# Patient Record
Sex: MALE | Race: WHITE | NOT HISPANIC OR LATINO | Employment: FULL TIME | URBAN - METROPOLITAN AREA
[De-identification: names, ages, dates, MRNs, and addresses within clinical notes are randomized per-mention and may not be internally consistent; named-entity substitution may affect disease eponyms.]

---

## 2023-01-27 ENCOUNTER — OFFICE VISIT (OUTPATIENT)
Dept: ORTHOPEDIC SURGERY | Facility: CLINIC | Age: 63
End: 2023-01-27
Payer: COMMERCIAL

## 2023-01-27 VITALS
BODY MASS INDEX: 27.43 KG/M2 | SYSTOLIC BLOOD PRESSURE: 138 MMHG | DIASTOLIC BLOOD PRESSURE: 90 MMHG | WEIGHT: 185.2 LBS | HEIGHT: 69 IN

## 2023-01-27 DIAGNOSIS — M25.531 RIGHT WRIST PAIN: Primary | ICD-10-CM

## 2023-01-27 DIAGNOSIS — S69.81XA INJURY OF TRIANGULAR FIBROCARTILAGE COMPLEX (TFCC) OF RIGHT WRIST, INITIAL ENCOUNTER: ICD-10-CM

## 2023-01-27 DIAGNOSIS — M77.8 EXTENSOR CARPI ULNARIS TENDINITIS: ICD-10-CM

## 2023-01-27 PROCEDURE — 99204 OFFICE O/P NEW MOD 45 MIN: CPT | Performed by: STUDENT IN AN ORGANIZED HEALTH CARE EDUCATION/TRAINING PROGRAM

## 2023-01-27 RX ORDER — NEBIVOLOL 5 MG/1
TABLET ORAL
COMMUNITY
Start: 2019-03-15

## 2023-01-27 RX ORDER — FAMOTIDINE 10 MG/ML
INJECTION, SOLUTION INTRAVENOUS
COMMUNITY
Start: 2019-03-15

## 2023-01-27 RX ORDER — METHYLPREDNISOLONE 4 MG/1
TABLET ORAL
Qty: 21 TABLET | Refills: 0 | Status: SHIPPED | OUTPATIENT
Start: 2023-01-27

## 2023-01-27 NOTE — PROGRESS NOTES
[facility] [place] [facility] Office Visit     Office Visit       Date: 01/27/2023   Patient Name: Geovanni Pabon  MRN: [id number]  YOB: 1960    Referring Physician: Referring, Self     Chief Complaint:   Chief Complaint   Patient presents with   • Right Wrist - Pain       History of Present Illness:   Geovanni Pabon is a 62 y.o. male who presents with new problem of: right wrist pain.  Onset: atraumatic and gradual in nature. The issue has been ongoing for 3+ week(s). Pain is a 3/10 on the pain scale. Pain is described as dull. Associated symptoms include pain. The pain is worse with any movement of the joint; ice, Voltaren gel and pain medication and/or NSAID improve the pain. Previous treatments have included: NSAIDS.    Subjective   Review of Systems: Review of Systems   Constitutional: Negative for chills, fever, unexpected weight gain and unexpected weight loss.   HENT: Negative for congestion, postnasal drip and rhinorrhea.    Eyes: Negative for blurred vision.   Respiratory: Negative for shortness of breath.    Cardiovascular: Negative for leg swelling.   Gastrointestinal: Negative for abdominal pain, nausea and vomiting.   Genitourinary: Negative for difficulty urinating.   Musculoskeletal: Positive for arthralgias. Negative for gait problem, joint swelling and myalgias.   Skin: Negative for skin lesions and wound.   Neurological: Negative for dizziness, weakness, light-headedness and numbness.   Hematological: Does not bruise/bleed easily.   Psychiatric/Behavioral: Negative for depressed mood.        I have reviewed the following portions of the patient's history:History of Present Illness and review of systems.    Past Medical History:   Past Medical History:   Diagnosis Date   • Hyperlipidemia    • Hypertension    • Wrist sprain 01/02,2023    pain in R wrist       Past Surgical History: History reviewed. No pertinent surgical history.    Family History:  "  Family History   Problem Relation Age of Onset   • Heart attack Father 82   • Cancer Brother          from colon cancer at age 59, skin cancer       Social History:   Social History     Socioeconomic History   • Marital status:    Tobacco Use   • Smoking status: Never   • Smokeless tobacco: Never   Substance and Sexual Activity   • Alcohol use: Yes     Alcohol/week: 4.0 standard drinks     Types: 4 Cans of beer per week   • Drug use: Never   • Sexual activity: Yes     Partners: Female     Comment: my wife of 42 years       Medications:   Current Outpatient Medications:   •  famotidine (PEPCID) 40 MG/4ML solution injection, , Disp: , Rfl:   •  nebivolol (BYSTOLIC) 5 MG tablet, , Disp: , Rfl:   •  Rosuvastatin Calcium 40 MG capsule sprinkle, Take  by mouth., Disp: , Rfl:   •  methylPREDNISolone (MEDROL) 4 MG dose pack, Take as directed on package instructions., Disp: 21 tablet, Rfl: 0    Allergies:   Allergies   Allergen Reactions   • Sulfa Antibiotics Hives       I reviewed the patient's chief complaint, history of present illness, review of systems, past medical history, surgical history, family history, social history, medications and allergy list.     Objective    Vital Signs:   Vitals:    23 1426   BP: 138/90   Weight: 84 kg (185 lb 3.2 oz)   Height: 174 cm (68.5\")     Body mass index is 27.75 kg/m². BMI is >= 25 and <30. (Overweight) The following options were offered after discussion;: exercise counseling/recommendations and nutrition counseling/recommendations  .   Patient reports that he is a non-smoker and has not ever been a smoker.  This behavior was applauded and he was encouraged to continue in smoking cessation.  We will continue to monitor at subsequent visits.    Ortho Exam:  Constitutional: General Appearance: healthy-appearing, NAD, and normal body habitus.   Psychiatric: Mood and Affect: normal mood and affect and active and alert.   Cardiovascular System: Arterial Pulses Right: " radial pulse normal and ulnar pulse normal. Edema Right: none. Varicosities Right: no varicosities and capillary refill test normal.   Wrists: Inspection Right: no erythema, induration, swelling, warmth, or mass and normal wrist appearance. Palpation of the Radial Aspect Right: no tenderness of the distal forearm, the radial styloid process, the navicular, the trapezoid, the tubercle of radius, the capitate, the first metacarpal, the abductor tendon, the extensor tendon, the flexor carpi radialis, or the palmaris longus. Palpation of the Ulnar Aspect Right: no tenderness of the lunate, the triquetral, the lunotriquetral joint, the hook of hamate, the pisotriquetral joint, the pisiform, the flexor carpi ulnaris, or the canal of guyon and tenderness of the ulnar styloid process and the carpi ulnaris extensor tendon. Active Range of Motion Right: flexion normal, extension normal, pronation normal, supination normal, radial motion normal, thumb motion normal, and ulnar motion (5 deg.). Strength Right: extension 5/5, flexion 5/5, pronation 5/5, supination 5/5, radial deviation 5/5, thumb 5/5,  5/5, interossei 5/5, and ulnar deviation 4/5. Stability Right: [place]-Littler test negative, lunotriquetral ballottement test negative, Lopez's scaphoid shift test negative, and pivot shift test of midcarpal joint negative.   Skin: Right Upper Extremity: normal.   Neurological System: Sensation on the Right: normal distal extremities. Special Tests on the Right: TFCC grind test negative and ECU subluxation test negative.    Results Review:   Imaging Results (Last 24 Hours)     Procedure Component Value Units Date/Time    XR Wrist 3+ View Right [[id number]] Resulted: 01/27/23 1455     Updated: 01/27/23 9951    Narrative:      Indication: Right wrist pain    Views: AP and lateral views of the wrist are submitted.     Impression: Alignment is normal. There are no acute findings. There is no   fracture subluxation or dislocation.   No significant degenerative changes   noted.    Comparison: No additional images available for comparison review.         Procedures    Assessment / Plan    Assessment/Plan:   Diagnoses and all orders for this visit:    1. Right wrist pain (Primary)  -     Cancel: XR Hand 3+ View Right  -     XR Wrist 3+ View Right    2. Injury of triangular fibrocartilage complex (TFCC) of right wrist, initial encounter    3. Extensor carpi ulnaris tendinitis  -     methylPREDNISolone (MEDROL) 4 MG dose pack; Take as directed on package instructions.  Dispense: 21 tablet; Refill: 0      3 weeks of right ulnar-sided wrist pain.  Symptoms worse with activation of the wrist in extension.  He does CrossFit training 3 times per week which often requires a lot of push-ups and weightlifting exercises that provoke his wrist.  Now he has pain with lifting his coffee cup.  Radiographs negative for acute or degenerative change.  Most tender at the volar aspect of the wrist at the TFCC as well as through the ECU tendon.  Plan to treat his symptoms at this time with a Medrol Dosepak to control his inflammation and pain.  Would encourage him to continue using her wrist but limit activities such as push-ups, weightlifting exercises that greatly exacerbate his pain.  Activities of daily living as tolerated.  Plan to see him back in 2 weeks to monitor his response and decide on additional treatment options.  He has a golf trip in 2 weeks.    Follow Up:   Return in about 2 weeks (around 2/10/2023) for Recheck.      Charlie Palomares MD  [facility]

## 2023-02-09 ENCOUNTER — OFFICE VISIT (OUTPATIENT)
Dept: ORTHOPEDIC SURGERY | Facility: CLINIC | Age: 63
End: 2023-02-09
Payer: COMMERCIAL

## 2023-02-09 VITALS
SYSTOLIC BLOOD PRESSURE: 134 MMHG | DIASTOLIC BLOOD PRESSURE: 84 MMHG | BODY MASS INDEX: 27.44 KG/M2 | HEIGHT: 69 IN | WEIGHT: 185.3 LBS

## 2023-02-09 DIAGNOSIS — S69.81XD INJURY OF TRIANGULAR FIBROCARTILAGE COMPLEX (TFCC) OF RIGHT WRIST, SUBSEQUENT ENCOUNTER: ICD-10-CM

## 2023-02-09 DIAGNOSIS — M77.8 EXTENSOR CARPI ULNARIS TENDINITIS: Primary | ICD-10-CM

## 2023-02-09 PROCEDURE — 76942 ECHO GUIDE FOR BIOPSY: CPT | Performed by: STUDENT IN AN ORGANIZED HEALTH CARE EDUCATION/TRAINING PROGRAM

## 2023-02-09 PROCEDURE — 99213 OFFICE O/P EST LOW 20 MIN: CPT | Performed by: STUDENT IN AN ORGANIZED HEALTH CARE EDUCATION/TRAINING PROGRAM

## 2023-02-09 PROCEDURE — 20551 NJX 1 TENDON ORIGIN/INSJ: CPT | Performed by: STUDENT IN AN ORGANIZED HEALTH CARE EDUCATION/TRAINING PROGRAM

## 2023-02-09 RX ORDER — LIDOCAINE HYDROCHLORIDE 10 MG/ML
1 INJECTION, SOLUTION EPIDURAL; INFILTRATION; INTRACAUDAL; PERINEURAL
Status: COMPLETED | OUTPATIENT
Start: 2023-02-09 | End: 2023-02-09

## 2023-02-09 RX ORDER — FAMOTIDINE 40 MG/1
TABLET, FILM COATED ORAL
COMMUNITY
Start: 2023-01-30

## 2023-02-09 RX ORDER — TRIAMCINOLONE ACETONIDE 40 MG/ML
40 INJECTION, SUSPENSION INTRA-ARTICULAR; INTRAMUSCULAR
Status: COMPLETED | OUTPATIENT
Start: 2023-02-09 | End: 2023-02-09

## 2023-02-09 RX ORDER — MELOXICAM 15 MG/1
15 TABLET ORAL DAILY
Qty: 30 TABLET | Refills: 1 | Status: SHIPPED | OUTPATIENT
Start: 2023-02-09

## 2023-02-09 RX ADMIN — TRIAMCINOLONE ACETONIDE 40 MG: 40 INJECTION, SUSPENSION INTRA-ARTICULAR; INTRAMUSCULAR at 15:34

## 2023-02-09 RX ADMIN — LIDOCAINE HYDROCHLORIDE 1 ML: 10 INJECTION, SOLUTION EPIDURAL; INFILTRATION; INTRACAUDAL; PERINEURAL at 15:34

## 2023-02-09 NOTE — PROGRESS NOTES
[facility] [place] [facility] [place] Up Visit     Office Follow Up      Date: 02/09/2023   Patient Name: Geovanni Pabon  MRN: [id number]  YOB: 1960    Referring Physician: No ref. provider found     Chief Complaint:   Chief Complaint   Patient presents with   • Follow-up     2 weeks f/u; Right wrist pain       History of Present Illness: Geovanni Pabon is a 62 y.o. male who is here today for follow up on right wrist pain.  At last visit, we started on Medrol Dosepak.  Reports interval improvement in symptoms.  Pain is now 1-2/10.  Still has pain with some movement of the wrist especially extension and ulnar deviation. Has not been able to progress back to Crossfit.    Subjective   Review of Systems: Review of Systems   Constitutional: Negative for chills, fever, unexpected weight gain and unexpected weight loss.   HENT: Negative for congestion, postnasal drip and rhinorrhea.    Eyes: Negative for blurred vision.   Respiratory: Negative for shortness of breath.    Cardiovascular: Negative for leg swelling.   Gastrointestinal: Negative for abdominal pain, nausea and vomiting.   Genitourinary: Negative for difficulty urinating.   Musculoskeletal: Positive for arthralgias. Negative for gait problem, joint swelling and myalgias.   Skin: Negative for skin lesions and wound.   Neurological: Negative for dizziness, weakness, light-headedness and numbness.   Hematological: Does not bruise/bleed easily.   Psychiatric/Behavioral: Negative for depressed mood.        Medications:   Current Outpatient Medications:   •  famotidine (PEPCID) 40 MG tablet, , Disp: , Rfl:   •  famotidine (PEPCID) 40 MG/4ML solution injection, , Disp: , Rfl:   •  methylPREDNISolone (MEDROL) 4 MG dose pack, Take as directed on package instructions., Disp: 21 tablet, Rfl: 0  •  nebivolol (BYSTOLIC) 5 MG tablet, , Disp: , Rfl:   •  Rosuvastatin Calcium 40 MG capsule sprinkle, Take  by mouth., Disp: ,  "Rfl:   •  meloxicam (MOBIC) 15 MG tablet, Take 1 tablet by mouth Daily., Disp: 30 tablet, Rfl: 1    Allergies:   Allergies   Allergen Reactions   • Sulfa Antibiotics Hives       I have reviewed and updated the patient's chief complaint, history of present illness, review of systems, past medical history, surgical history, family history, social history, medications and allergy list as appropriate.     Objective    Vital Signs:   Vitals:    02/09/23 1453   BP: 134/84   Weight: 84.1 kg (185 lb 4.8 oz)   Height: 174 cm (68.5\")     Body mass index is 27.76 kg/m².  BMI is >= 25 and <30. (Overweight) The following options were offered after discussion;: exercise counseling/recommendations and nutrition counseling/recommendations    Patient reports that he is a non-smoker and has not ever been a smoker.  This behavior was applauded and he was encouraged to continue in smoking cessation.  We will continue to monitor at subsequent visits.     Ortho Exam:  Cardiovascular System: Arterial Pulses Right: radial pulse normal and ulnar pulse normal. Edema Right: none. Varicosities Right: no varicosities and capillary refill test normal.   Wrists: Inspection Right: no erythema, induration, swelling, warmth, or mass and normal wrist appearance. Palpation of the Radial Aspect Right: no tenderness of the distal forearm, the radial styloid process, the navicular, the trapezoid, the tubercle of radius, the capitate, the first metacarpal, the abductor tendon, the extensor tendon, the flexor carpi radialis, or the palmaris longus. Palpation of the Ulnar Aspect Right: no tenderness of the lunate, the triquetral, the lunotriquetral joint, the hook of hamate, the pisotriquetral joint, the pisiform, the flexor carpi ulnaris, or the canal of guyon and tenderness of the ulnar styloid process and the carpi ulnaris extensor tendon. Active Range of Motion Right: flexion normal, extension normal, pronation normal, supination normal, radial motion " normal, thumb motion normal, and ulnar motion (5 deg.). Strength Right: extension 5/5, flexion 5/5, pronation 5/5, supination 5/5, radial deviation 5/5, thumb 5/5,  5/5, interossei 5/5, and ulnar deviation 4/5. Stability Right: [place]-Littler test negative, lunotriquetral ballottement test negative, Lopez's scaphoid shift test negative, and pivot shift test of midcarpal joint negative.     Results Review:   Imaging Results (Last 24 Hours)     Procedure Component Value Units Date/Time    US Guided Injection [[id number]] Resulted: 02/09/23 1539     Updated: 02/09/23 1557        Procedures    Assessment / Plan    Assessment/Plan:   Diagnoses and all orders for this visit:    1. Extensor carpi ulnaris tendinitis (Primary)  -     US Guided Injection  -     meloxicam (MOBIC) 15 MG tablet; Take 1 tablet by mouth Daily.  Dispense: 30 tablet; Refill: 1    2. Injury of triangular fibrocartilage complex (TFCC) of right wrist, subsequent encounter    Other orders  -     Cancel: - Injection Tendon or Ligament  -     Cancel: Small Joint Arthrocentesis  -     right wrist ECU injection       Follow-up on right wrist pain.  Continues to localize pain over the ulnar side of his wrist.  Pain worsened with wrist extension and ulnar deviation.  Did have good improvement with Medrol Dosepak but has not been able to return back to desired physical activity.  Pain reproduced on exam with movements at the ECU tendon.  His TFCC is much less irritated today after the medication.  Given his lack of return to activity, we discussed escalation of care.  I have discussion of the risks and benefits, patient elected to proceed with ultrasound-guided ECU tendon injection at the tendon sheath/origin.  Tolerated procedure well.  See procedure note.  We will also place him on anti-inflammatory medicine for his upcoming trip.  I will see him back in 6 weeks to monitor his response.    Follow Up:   Return in about 6 weeks (around 3/23/2023) for  Sony.      Charlie Palomares MD  [facility] [place] [facility]

## 2023-02-09 NOTE — PROGRESS NOTES
Procedure   Right wrist ECU injection   Date/Time: 2/9/2023 3:34 PM  Consent given by: patient  Site marked: site marked  Timeout: Immediately prior to procedure a time out was called to verify the correct patient, procedure, equipment, support staff and site/side marked as required   Supporting Documentation  Indications: pain   Procedure Details  Location: wrist (medial right wrist) -   Preparation: Patient was prepped and draped in the usual sterile fashion  Needle size: 25 G  Approach: anteromedial  Medications administered: 1 mL lidocaine PF 1% 1 %; 40 mg triamcinolone acetonide 40 MG/ML  Patient tolerance: patient tolerated the procedure well with no immediate complications

## 2023-04-24 ENCOUNTER — OFFICE VISIT (OUTPATIENT)
Dept: ORTHOPEDIC SURGERY | Facility: CLINIC | Age: 63
End: 2023-04-24
Payer: COMMERCIAL

## 2023-04-24 VITALS
HEIGHT: 69 IN | BODY MASS INDEX: 27.11 KG/M2 | SYSTOLIC BLOOD PRESSURE: 130 MMHG | WEIGHT: 183 LBS | DIASTOLIC BLOOD PRESSURE: 80 MMHG

## 2023-04-24 DIAGNOSIS — M75.102 ROTATOR CUFF SYNDROME OF LEFT SHOULDER: ICD-10-CM

## 2023-04-24 DIAGNOSIS — M25.512 LEFT SHOULDER PAIN, UNSPECIFIED CHRONICITY: Primary | ICD-10-CM

## 2023-04-24 RX ADMIN — TRIAMCINOLONE ACETONIDE 40 MG: 40 INJECTION, SUSPENSION INTRA-ARTICULAR; INTRAMUSCULAR at 14:11

## 2023-04-24 RX ADMIN — LIDOCAINE HYDROCHLORIDE 4 ML: 10 INJECTION, SOLUTION EPIDURAL; INFILTRATION; INTRACAUDAL; PERINEURAL at 14:11

## 2023-04-24 NOTE — PROGRESS NOTES
[facility] [place] [facility] Note    Subjective     Chief Complaint   Patient presents with   • Left Shoulder - Pain        HPI  Geovanni Pabon is a 62 y.o. male.  Right-hand-dominant.  Established patient presents for evaluation of left shoulder pain.  Patient reports he has had intermittent pain for a month or 2 to the shoulder while doing weight lifting and certain CrossFit exercises. Then yesterday when doing the exercises felt a pop in the shoulder.  He has had continued pain since then.    Pain scale: 6/10.  Severity of the pain moderate.  Quality of the pain aching, stabbing.  Associated symptoms pain.  Activity related to pain movement of joint.  Pain eased by resting, ice, heat, medication.  No reported numbness or tingling.  Prior treatments dual action Advil/acetaminophen.    Notes difficulty with lifting, reaching, overhead activities.    Denies fever, chills, night sweats or other constitutional symptoms.      Past Medical History:   Diagnosis Date   • Hyperlipidemia    • Hypertension    • Wrist sprain     pain in R wrist      History reviewed. No pertinent surgical history.   Family History   Problem Relation Age of Onset   • Heart attack Father 82   • Cancer Brother          from colon cancer at age 59     Social History     Socioeconomic History   • Marital status:    Tobacco Use   • Smoking status: Never   • Smokeless tobacco: Never   Substance and Sexual Activity   • Alcohol use: Yes     Alcohol/week: 4.0 standard drinks     Types: 4 Cans of beer per week   • Drug use: Never   • Sexual activity: Yes     Partners: Female     Birth control/protection: Vasectomy     Comment: my wife of 42 years      Current Outpatient Medications on File Prior to Visit   Medication Sig Dispense Refill   • famotidine (PEPCID) 40 MG tablet      • nebivolol (BYSTOLIC) 5 MG tablet      • Rosuvastatin Calcium 40 MG capsule sprinkle Take  by mouth.     • meloxicam (MOBIC) 15 MG tablet Take 1 tablet  "by mouth Daily. (Patient not taking: Reported on 4/24/2023) 30 tablet 1   • [DISCONTINUED] famotidine (PEPCID) 40 MG/4ML solution injection      • [DISCONTINUED] methylPREDNISolone (MEDROL) 4 MG dose pack Take as directed on package instructions. 21 tablet 0     No current facility-administered medications on file prior to visit.      Allergies   Allergen Reactions   • Sulfa Antibiotics Hives        The following portions of the patient's history were reviewed and updated as appropriate: allergies, current medications, past family history, past medical history, past social history, past surgical history and problem list.    Review of Systems   Constitutional: Negative.    HENT: Negative.    Eyes: Negative.    Respiratory: Negative.    Cardiovascular: Negative.    Gastrointestinal: Negative.    Endocrine: Negative.    Genitourinary: Negative.    Musculoskeletal: Positive for arthralgias.   Skin: Negative.    Allergic/Immunologic: Negative.    Neurological: Negative.    Hematological: Negative.    Psychiatric/Behavioral: Negative.         Objective      Physical Exam  /80   Ht 174 cm (68.5\")   Wt 83 kg (183 lb)   BMI 27.42 kg/m²     Body mass index is 27.42 kg/m².    GENERAL APPEARANCE: awake, alert & oriented x 3, in no acute distress and well developed, well nourished  PSYCH: normal mood and affect  LUNGS:  breathing nonlabored, no wheezing  EYES: sclera anicteric, pupils equal  CARDIOVASCULAR: palpable pulses. Capillary refill less than 2 seconds  INTEGUMENTARY: skin intact, no clubbing, cyanosis  NEUROLOGIC:  Normal Sensation         Ortho Exam  Left shoulder  Skin: Intact without any erythema, warmth or swelling.  Normal muscle tone and bulk.  Tenderness: Positive anterior lateral aspect shoulder.  Negative over bicep tendon/groove.    Motion: Active , Abd 130, ER (elbows at side) 65, IR L1.  Impingement: Neer positive.  Pérez positive.  Rotator cuff: Berlin/Empty can positive. Drop arm negative. " Liff-off/modified lift-off negative. Bear hug negative.  Biceps: Speed's negative.  Deltoid: Intact  Strength: 5/5 SS, IS, SubSc  Motor: Grossly intact to Ax/MSC/R/U/M/AIN/PIN  Sensory: Grossly intact to Ax/MSC/R/U/M nerve distributions.  Vascular: 2+ radial pulse with brisk capillary refill into each digit.      Imaging/Studies   Ordered left shoulder plain films.  Imaging read/interpreted by Dr. Owens.     Left Shoulder X-Ray  Indication: Pain  AP, scapular Y, and axillary lateral views     Findings: Tiny ossicle at the AC joint most likely of no clinical significance  No fracture  Normal soft tissues  Normal joint spaces     No prior studies were available for comparison.    Assessment/Plan        ICD-10-CM ICD-9-CM   1. Left shoulder pain, unspecified chronicity  M25.512 719.41   2. Rotator cuff syndrome of left shoulder  M75.102 726.10       Orders Placed This Encounter   Procedures   • Large Joint Arthrocentesis: L subacromial bursa        Left shoulder pain due to rotator cuff syndrome.  Reviewed imaging with the patient.  Patient provided home shoulder exercises--Thera-Band and Berlin exercises.  Offered and accepted subacromial corticosteroid injection. Injection given today.  Recommend OTC NSAIDs/pain medication as needed.  Follow up in 4 weeks for repeat evaluation.  Questions and concerns answered.    After discussing the risks, benefits, indications of injection, the patient gave consent to proceed.  His left shoulder subacromial space was confirmed as the correct site to be injected with a timeout.  It was then prepped using Hibiclens and injected with a mixture of 4 cc of 1% plain lidocaine and 1 cc of Kenalog (40 mg per mL), without any resistance through the posterior lateral approach, patient in seated position.  Area was cleaned, hemostasis was achieved and a Band-Aid was applied over the injection site.  The patient tolerated procedure well.  I instructed the patient on signs and symptoms of  infection.  They should report to the emergency department or return to clinic if any of these develop, for further evaluation and treatment.  Recommended modifying activity for the next 48 hours to include rest, ice, elevation and oral pain medication as needed.        Medical Decision Making  Management Options : over-the-counter medicine and prescription/IM medicine  Data/Risk: radiology tests       Jaz Tsang PA-C  04/24/23  14:30 EDT               EMR Dragon/Transcription disclaimer:  Much of this encounter note is an electronic transcription of spoken language to printed text. Electronic transcription of spoken language may permit erroneous, or at times, nonsensical words or phrases to be inadvertently transcribed. Although I have reviewed the note for such errors, some may still exist.

## 2023-04-24 NOTE — PROGRESS NOTES
Procedure   Large Joint Arthrocentesis: L subacromial bursa  Date/Time: 4/24/2023 2:11 PM  Consent given by: patient  Site marked: site marked  Timeout: Immediately prior to procedure a time out was called to verify the correct patient, procedure, equipment, support staff and site/side marked as required   Supporting Documentation  Indications: pain   Procedure Details  Location: shoulder - L subacromial bursa  Preparation: Patient was prepped and draped in the usual sterile fashion  Needle gauge: 21g.  Approach: posterior  Medications administered: 4 mL lidocaine PF 1% 1 %; 40 mg triamcinolone acetonide 40 MG/ML  Patient tolerance: patient tolerated the procedure well with no immediate complications

## 2023-04-28 RX ORDER — LIDOCAINE HYDROCHLORIDE 10 MG/ML
4 INJECTION, SOLUTION EPIDURAL; INFILTRATION; INTRACAUDAL; PERINEURAL
Status: COMPLETED | OUTPATIENT
Start: 2023-04-24 | End: 2023-04-24

## 2023-04-28 RX ORDER — TRIAMCINOLONE ACETONIDE 40 MG/ML
40 INJECTION, SUSPENSION INTRA-ARTICULAR; INTRAMUSCULAR
Status: COMPLETED | OUTPATIENT
Start: 2023-04-24 | End: 2023-04-24

## 2023-05-22 ENCOUNTER — OFFICE VISIT (OUTPATIENT)
Dept: ORTHOPEDIC SURGERY | Facility: CLINIC | Age: 63
End: 2023-05-22

## 2023-05-22 VITALS
HEIGHT: 67 IN | BODY MASS INDEX: 28.69 KG/M2 | DIASTOLIC BLOOD PRESSURE: 84 MMHG | WEIGHT: 182.8 LBS | SYSTOLIC BLOOD PRESSURE: 160 MMHG

## 2023-05-22 DIAGNOSIS — M75.102 ROTATOR CUFF SYNDROME OF LEFT SHOULDER: Primary | ICD-10-CM

## 2023-05-22 PROCEDURE — 99213 OFFICE O/P EST LOW 20 MIN: CPT | Performed by: PHYSICIAN ASSISTANT

## 2023-05-22 RX ORDER — FAMOTIDINE 40 MG/1
TABLET, FILM COATED ORAL EVERY 24 HOURS
COMMUNITY

## 2023-05-22 NOTE — PROGRESS NOTES
"    [facility] Note        Subjective     CC: Follow-up (4 week follow up --- Rotator cuff syndrome of left shoulder )      HPI    Geovanni Pabon is a 62 y.o. male.  Patient returns today for follow-up evaluation of his left shoulder.  Since undergoing subacromial corticosteroid injection 4/24/2023 as well as doing home exercises with for the shoulder ([place]).    Currently endorses pain scale: 0-1/10.  When he does have pain he states it is mild and tolerable.    Overall, patient's symptoms are improved.    ROS:    Constiutional:Pt denies fever, chills, nausea, or vomiting.  MSK:as above        Objective      Past Medical History  Past Medical History:   Diagnosis Date   • Hyperlipidemia    • Hypertension    • Wrist sprain 01/02,2023    pain in R wrist     Social History     Socioeconomic History   • Marital status:    Tobacco Use   • Smoking status: Never   • Smokeless tobacco: Never   Substance and Sexual Activity   • Alcohol use: Yes     Alcohol/week: 4.0 standard drinks     Types: 4 Cans of beer per week   • Drug use: Never   • Sexual activity: Yes     Partners: Female     Birth control/protection: Vasectomy     Comment: my wife of 42 years          Physical Exam  /84   Ht 169.8 cm (66.85\")   Wt 82.9 kg (182 lb 12.8 oz)   BMI 28.76 kg/m²     Body mass index is 28.76 kg/m².    Patient is well nourished and well developed.        Ortho Exam  Left shoulder  Tenderness:  No significant pain to the shoulder on exam today.    Motion: Full range of motion of the shoulder  Impingement: Neer  negative.  Pérez negative.  Rotator cuff: [place]/Empty can  negative. Drop arm negative. Liff-off/modified lift-off negative. Bear hug negative.  Deltoid: Intact  Strength: 5/5 SS, IS, SubSc  Motor/sensory: Grossly intact C5-T1.      Imaging/Labs/EMG Reviewed:  No new imaging today.      Assessment:  1. Rotator cuff syndrome of left shoulder        Plan:  1. Left shoulder rotator cuff " syndrome--improved with corticosteroid injection as well as home exercises.  2. Recommend continue with home shoulder exercises to maintain strength and mobility.  3. May slowly advance activity as tolerated.  4. Recommend continuation of OTC NSAIDs/pain medication as needed.  5. Follow-up as needed  6. Questions and concerns answered.      Jaz Tsang PA-C  05/31/23  17:06 EDT      Dictated Utilizing Dragon Dictation.

## 2024-08-27 ENCOUNTER — LAB (OUTPATIENT)
Dept: LAB | Facility: HOSPITAL | Age: 64
End: 2024-08-27
Payer: COMMERCIAL

## 2024-08-27 ENCOUNTER — TRANSCRIBE ORDERS (OUTPATIENT)
Dept: LAB | Facility: HOSPITAL | Age: 64
End: 2024-08-27
Payer: COMMERCIAL

## 2024-08-27 DIAGNOSIS — E78.00 PURE HYPERCHOLESTEROLEMIA: ICD-10-CM

## 2024-08-27 DIAGNOSIS — E78.00 PURE HYPERCHOLESTEROLEMIA: Primary | ICD-10-CM

## 2024-08-27 LAB
ALBUMIN SERPL-MCNC: 4.8 G/DL (ref 3.5–5.2)
ALBUMIN/GLOB SERPL: 1.9 G/DL
ALP SERPL-CCNC: 41 U/L (ref 39–117)
ALT SERPL W P-5'-P-CCNC: 26 U/L (ref 1–41)
ANION GAP SERPL CALCULATED.3IONS-SCNC: 9.9 MMOL/L (ref 5–15)
AST SERPL-CCNC: 38 U/L (ref 1–40)
BILIRUB SERPL-MCNC: 1.3 MG/DL (ref 0–1.2)
BUN SERPL-MCNC: 10 MG/DL (ref 8–23)
BUN/CREAT SERPL: 9.6 (ref 7–25)
CALCIUM SPEC-SCNC: 10 MG/DL (ref 8.6–10.5)
CHLORIDE SERPL-SCNC: 106 MMOL/L (ref 98–107)
CHOLEST SERPL-MCNC: 141 MG/DL (ref 0–200)
CO2 SERPL-SCNC: 26.1 MMOL/L (ref 22–29)
CREAT SERPL-MCNC: 1.04 MG/DL (ref 0.76–1.27)
EGFRCR SERPLBLD CKD-EPI 2021: 80.2 ML/MIN/1.73
GLOBULIN UR ELPH-MCNC: 2.5 GM/DL
GLUCOSE SERPL-MCNC: 100 MG/DL (ref 65–99)
HDLC SERPL-MCNC: 58 MG/DL (ref 40–60)
LDLC SERPL CALC-MCNC: 68 MG/DL (ref 0–100)
LDLC/HDLC SERPL: 1.16 {RATIO}
POTASSIUM SERPL-SCNC: 4.3 MMOL/L (ref 3.5–5.2)
PROT SERPL-MCNC: 7.3 G/DL (ref 6–8.5)
SODIUM SERPL-SCNC: 142 MMOL/L (ref 136–145)
TRIGL SERPL-MCNC: 79 MG/DL (ref 0–150)
VLDLC SERPL-MCNC: 15 MG/DL (ref 5–40)

## 2024-08-27 PROCEDURE — 80053 COMPREHEN METABOLIC PANEL: CPT | Performed by: FAMILY MEDICINE

## 2024-08-27 PROCEDURE — 36415 COLL VENOUS BLD VENIPUNCTURE: CPT | Performed by: FAMILY MEDICINE

## 2024-08-27 PROCEDURE — 80061 LIPID PANEL: CPT
